# Patient Record
Sex: FEMALE | Race: OTHER | Employment: FULL TIME | URBAN - METROPOLITAN AREA
[De-identification: names, ages, dates, MRNs, and addresses within clinical notes are randomized per-mention and may not be internally consistent; named-entity substitution may affect disease eponyms.]

---

## 2017-01-19 ENCOUNTER — HOSPITAL ENCOUNTER (EMERGENCY)
Age: 42
Discharge: HOME | End: 2017-01-19
Attending: EMERGENCY MEDICINE
Payer: SELF-PAY

## 2017-01-19 ENCOUNTER — APPOINTMENT (OUTPATIENT)
Dept: CT IMAGING | Age: 42
End: 2017-01-19
Attending: EMERGENCY MEDICINE
Payer: SELF-PAY

## 2017-01-19 VITALS
SYSTOLIC BLOOD PRESSURE: 183 MMHG | RESPIRATION RATE: 20 BRPM | HEIGHT: 66 IN | WEIGHT: 153 LBS | HEART RATE: 97 BPM | BODY MASS INDEX: 24.59 KG/M2 | OXYGEN SATURATION: 100 % | DIASTOLIC BLOOD PRESSURE: 93 MMHG | TEMPERATURE: 99 F

## 2017-01-19 DIAGNOSIS — T17.208A FOREIGN BODY IN THROAT, INITIAL ENCOUNTER: Primary | ICD-10-CM

## 2017-01-19 DIAGNOSIS — R07.9 ACUTE CHEST PAIN: ICD-10-CM

## 2017-01-19 LAB
ALBUMIN SERPL BCP-MCNC: 3.9 G/DL (ref 3.5–5)
ALBUMIN/GLOB SERPL: 1 {RATIO} (ref 1.1–2.2)
ALP SERPL-CCNC: 73 U/L (ref 45–117)
ALT SERPL-CCNC: 27 U/L (ref 12–78)
ANION GAP BLD CALC-SCNC: 13 MMOL/L (ref 5–15)
AST SERPL W P-5'-P-CCNC: 18 U/L (ref 15–37)
ATRIAL RATE: 111 BPM
BASOPHILS # BLD AUTO: 0 K/UL (ref 0–0.1)
BASOPHILS # BLD: 0 % (ref 0–1)
BILIRUB SERPL-MCNC: 0.2 MG/DL (ref 0.2–1)
BUN SERPL-MCNC: 7 MG/DL (ref 6–20)
BUN/CREAT SERPL: 9 (ref 12–20)
CALCIUM SERPL-MCNC: 8.4 MG/DL (ref 8.5–10.1)
CALCULATED P AXIS, ECG09: 34 DEGREES
CALCULATED R AXIS, ECG10: 1 DEGREES
CALCULATED T AXIS, ECG11: 4 DEGREES
CHLORIDE SERPL-SCNC: 101 MMOL/L (ref 97–108)
CO2 SERPL-SCNC: 22 MMOL/L (ref 21–32)
CREAT SERPL-MCNC: 0.79 MG/DL (ref 0.55–1.02)
DIAGNOSIS, 93000: NORMAL
EOSINOPHIL # BLD: 0.6 K/UL (ref 0–0.4)
EOSINOPHIL NFR BLD: 6 % (ref 0–7)
ERYTHROCYTE [DISTWIDTH] IN BLOOD BY AUTOMATED COUNT: 16.3 % (ref 11.5–14.5)
GLOBULIN SER CALC-MCNC: 3.9 G/DL (ref 2–4)
GLUCOSE SERPL-MCNC: 164 MG/DL (ref 65–100)
HCT VFR BLD AUTO: 33 % (ref 35–47)
HGB BLD-MCNC: 10.4 G/DL (ref 11.5–16)
LYMPHOCYTES # BLD AUTO: 23 % (ref 12–49)
LYMPHOCYTES # BLD: 2.3 K/UL (ref 0.8–3.5)
MCH RBC QN AUTO: 25.1 PG (ref 26–34)
MCHC RBC AUTO-ENTMCNC: 31.5 G/DL (ref 30–36.5)
MCV RBC AUTO: 79.5 FL (ref 80–99)
MONOCYTES # BLD: 0.8 K/UL (ref 0–1)
MONOCYTES NFR BLD AUTO: 8 % (ref 5–13)
NEUTS SEG # BLD: 6.3 K/UL (ref 1.8–8)
NEUTS SEG NFR BLD AUTO: 63 % (ref 32–75)
P-R INTERVAL, ECG05: 142 MS
PLATELET # BLD AUTO: 399 K/UL (ref 150–400)
POTASSIUM SERPL-SCNC: 3.1 MMOL/L (ref 3.5–5.1)
PROT SERPL-MCNC: 7.8 G/DL (ref 6.4–8.2)
Q-T INTERVAL, ECG07: 320 MS
QRS DURATION, ECG06: 84 MS
QTC CALCULATION (BEZET), ECG08: 435 MS
RBC # BLD AUTO: 4.15 M/UL (ref 3.8–5.2)
SODIUM SERPL-SCNC: 136 MMOL/L (ref 136–145)
TROPONIN I SERPL-MCNC: <0.04 NG/ML
VENTRICULAR RATE, ECG03: 111 BPM
WBC # BLD AUTO: 10 K/UL (ref 3.6–11)

## 2017-01-19 PROCEDURE — 74011000250 HC RX REV CODE- 250: Performed by: EMERGENCY MEDICINE

## 2017-01-19 PROCEDURE — 70490 CT SOFT TISSUE NECK W/O DYE: CPT

## 2017-01-19 PROCEDURE — 74011250637 HC RX REV CODE- 250/637: Performed by: EMERGENCY MEDICINE

## 2017-01-19 PROCEDURE — 99284 EMERGENCY DEPT VISIT MOD MDM: CPT

## 2017-01-19 PROCEDURE — 93005 ELECTROCARDIOGRAM TRACING: CPT

## 2017-01-19 PROCEDURE — 36415 COLL VENOUS BLD VENIPUNCTURE: CPT | Performed by: EMERGENCY MEDICINE

## 2017-01-19 PROCEDURE — 85025 COMPLETE CBC W/AUTO DIFF WBC: CPT | Performed by: EMERGENCY MEDICINE

## 2017-01-19 PROCEDURE — 84484 ASSAY OF TROPONIN QUANT: CPT | Performed by: EMERGENCY MEDICINE

## 2017-01-19 PROCEDURE — 80053 COMPREHEN METABOLIC PANEL: CPT | Performed by: EMERGENCY MEDICINE

## 2017-01-19 RX ORDER — SODIUM CHLORIDE 0.9 % (FLUSH) 0.9 %
5-10 SYRINGE (ML) INJECTION EVERY 8 HOURS
Status: DISCONTINUED | OUTPATIENT
Start: 2017-01-19 | End: 2017-01-19 | Stop reason: HOSPADM

## 2017-01-19 RX ORDER — SODIUM CHLORIDE 0.9 % (FLUSH) 0.9 %
5-10 SYRINGE (ML) INJECTION AS NEEDED
Status: DISCONTINUED | OUTPATIENT
Start: 2017-01-19 | End: 2017-01-19 | Stop reason: HOSPADM

## 2017-01-19 RX ADMIN — LIDOCAINE HYDROCHLORIDE 40 ML: 20 SOLUTION ORAL; TOPICAL at 14:13

## 2017-01-19 NOTE — ED TRIAGE NOTES
Foreign body/bone swallowed 1 week ago; patient reports feeling something stuck in throat x 1 week, chest pain and tingling in both arms and neck.

## 2017-01-19 NOTE — ED NOTES
Patient arrived through triage accompanied by family c/o feeling like she got a chicken bone stuck in her throat a few days ago that is now causing her pain with swallowing and states she is scared to eat and drink. Patient is speaking sentances and tolerating po without difficulty. Patient states that she was worried and became sob and had some chest pain and pain into bilateral arms. Patient is resting in bed, bed in low position, call bell in reach,monitor x3, family at bedside.

## 2017-01-19 NOTE — ED NOTES
Patient discharged by provider. Patient has no new complaints at this time. Patient leaves ambulatory with steady gait.

## 2017-01-19 NOTE — DISCHARGE INSTRUCTIONS
Dolor de pecho: Instrucciones de cuidado - [ Chest Pain: Care Instructions ]  Instrucciones de cuidado  El dolor de pecho puede tener muchas causas. Algunas no son graves y mejorarán por sí solas en pocos días. Fer algunos tipos de dolor de pecho requieren más pruebas y [place]. Es posible que marin médico le haya recomendado jessica visita de seguimiento dentro de las 8 a 12 horas siguientes. Si no mejora, es posible que necesite [address] pruebas o [place]. Aunque marin médico le haya dado de gregory, es necesario que esté atento a cualquier problema que se presente. El médico le hizo un cuidadoso chequeo, fer a veces los problemas pueden aparecer posteriormente. Si tiene nuevos síntomas o éstos no mejoran, obtenga atención médica de inmediato. Si tiene dolor o presión en el pecho que empeora o es diferente y que dura más de 5 minutos, o se desmayó (perdió el conocimiento), llame al 911 o busque otra ayuda de emergencia de inmediato. Acudir a jessica consulta médica es sólo un paso en marin tratamiento. Aunque se sienta mejor, todavía deberá hacer lo que marin médico le recomiende, jeremie asistir a todas las visitas de seguimiento sugeridas y amparo los medicamentos exactamente [employer]	[place] fueron indicados. [place] le ayudará a recuperarse y prevenir problemas futuros. ¿Cómo puede cuidarse en el hogar? · Descanse hasta que se sienta mejor. · [place] alanna medicamentos exactamente jeremie le fueron recetados. Llame a marin médico si cande estar teniendo problemas con marin medicamento. · No conduzca después de amparo un analgésico (medicamento para el dolor) recetado. ¿Cuándo debe pedir ayuda? Llame al 911 si:  · Se desmayó (perdió el conocimiento). · Tiene graves dificultades para respirar. · Tiene síntomas de un ataque al corazón. Estos podrían incluir:  ¨ Dolor o presión en el pecho, o jessica sensación extraña en el pecho. ¨ Sudoración. ¨ Falta de aire. ¨ Náuseas o vómito.   ¨ Dolor, presión o jessica sensación extraña en la espalda, el saleem, la mandíbula, la parte superior del abdomen o en ozzie o ambos hombros o brazos. ¨ Aturdimiento o debilidad repentina. ¨ Latidos del corazón rápidos o irregulares. Después de llamar al 911, es posible que el operador le diga que mastique 1 aspirina para adultos o de 2 a 4 aspirinas de dosis baja. Espere a jessica ambulancia. No intente conducir usted mismo. Llame hoy a marin médico si:  · Tiene cualquier dificultad para respirar. · El dolor en el pecho empeora. · Siente mareos o aturdimiento, o que está a punto de [place]. · No mejora jeremie se esperaba. · Tiene dolor de pecho nuevo o diferente. ¿Dónde puede encontrar más información en inglés? Brittanie Durán a http://dajuan-rusty.info/. Escriba A120 en la búsqueda para aprender más acerca de \"Dolor de pecho: Instrucciones de cuidado - [ Chest Pain: Care Instructions ]. \"  Revisado: 27 capps, 2016  Versión del contenido: 11.1  © 9506-4939 Healthwise, Incorporated. Las instrucciones de cuidado fueron adaptadas bajo licencia por [employer] (which disclaims liability or warranty for this information). Si usted tiene [place] afección médica o sobre estas instrucciones, siempre pregunte a marin profesional de nell. Healthwise, Incorporated niega toda garantía o responsabilidad por marin uso de esta información. We hope that we have addressed all of your medical concerns. The examination and treatment you received in the Emergency Department were for an emergent problem and were not intended as complete care. It is important that you follow up with your healthcare provider(s) for ongoing care. If your symptoms worsen or do not improve as expected, and you are unable to reach your usual health care provider(s), you should return to the Emergency Department. Today's healthcare is undergoing tremendous change, and patient satisfaction surveys are one of the many tools to assess the quality of medical care.   You may receive a survey from the [employer] regarding your experience in the Emergency Department. I hope that your experience has been completely positive, particularly the medical care that I provided. As such, please participate in the survey; anything less than excellent does not meet my expectations or intentions. [address] [facility] and [address] participate in nationally recognized quality of care measures. If your blood pressure is greater than 120/80, as reported below, we urge that you seek medical care to address the potential of high blood pressure, commonly known as hypertension. Hypertension can be hereditary or can be caused by certain medical conditions, pain, stress, or \"white coat syndrome. \"       Please make an appointment with your health care provider(s) for follow up of your Emergency Department visit. VITALS:   Patient Vitals for the past 8 hrs:   Temp Pulse Resp BP SpO2   01/19/17 1405 - 97 - - 100 %   01/19/17 1403 - - - (!) 197/99 -   01/19/17 1333 99 °F (37.2 °C) (!) 114 20 (!) 202/100 100 %          Thank you for allowing us to provide you with medical care today. We realize that you have many choices for your emergency care needs. Please choose us in the future for any continued health care needs.       Rachel Robert MD    [facility], [facility].   Office: [phone]            Recent Results (from the past 24 hour(s))   CBC WITH AUTOMATED DIFF    Collection Time: 01/19/17  2:10 PM   Result Value Ref Range    WBC 10.0 3.6 - 11.0 K/uL    RBC 4.15 3.80 - 5.20 M/uL    HGB 10.4 (L) 11.5 - 16.0 g/dL    HCT 33.0 (L) 35.0 - 47.0 %    MCV 79.5 (L) 80.0 - 99.0 FL    MCH 25.1 (L) 26.0 - 34.0 PG    MCHC 31.5 30.0 - 36.5 g/dL    RDW 16.3 (H) 11.5 - 14.5 %    PLATELET 896 791 - 115 K/uL    NEUTROPHILS 63 32 - 75 %    LYMPHOCYTES 23 12 - 49 %    MONOCYTES 8 5 - 13 %    EOSINOPHILS 6 0 - 7 %    BASOPHILS 0 0 - 1 % ABS. NEUTROPHILS 6.3 1.8 - 8.0 K/UL    ABS. LYMPHOCYTES 2.3 0.8 - 3.5 K/UL    ABS. MONOCYTES 0.8 0.0 - 1.0 K/UL    ABS. EOSINOPHILS 0.6 (H) 0.0 - 0.4 K/UL    ABS. BASOPHILS 0.0 0.0 - 0.1 K/UL   METABOLIC PANEL, COMPREHENSIVE    Collection Time: 01/19/17  2:10 PM   Result Value Ref Range    Sodium 136 136 - 145 mmol/L    Potassium 3.1 (L) 3.5 - 5.1 mmol/L    Chloride 101 97 - 108 mmol/L    CO2 22 21 - 32 mmol/L    Anion gap 13 5 - 15 mmol/L    Glucose 164 (H) 65 - 100 mg/dL    BUN 7 6 - 20 MG/DL    Creatinine 0.79 0.55 - 1.02 MG/DL    BUN/Creatinine ratio 9 (L) 12 - 20      GFR est AA >60 >60 ml/min/1.73m2    GFR est non-AA >60 >60 ml/min/1.73m2    Calcium 8.4 (L) 8.5 - 10.1 MG/DL    Bilirubin, total 0.2 0.2 - 1.0 MG/DL    ALT 27 12 - 78 U/L    AST 18 15 - 37 U/L    Alk. phosphatase 73 45 - 117 U/L    Protein, total 7.8 6.4 - 8.2 g/dL    Albumin 3.9 3.5 - 5.0 g/dL    Globulin 3.9 2.0 - 4.0 g/dL    A-G Ratio 1.0 (L) 1.1 - 2.2     TROPONIN I    Collection Time: 01/19/17  2:10 PM   Result Value Ref Range    Troponin-I, Qt. <0.04 <0.05 ng/mL       Ct Neck Soft Tissue Wo Cont    Result Date: 1/19/2017  EXAM:  CT NECK SOFT TISSUE WO CONT INDICATION:  persistent foreign body sensation after swallowing fishbone 1.5 weeks ago COMPARISON: None. CONTRAST: None. . TECHNIQUE: Multislice helical CT was performed from the mid calvarium to the aortic arch without intravenous contrast administration. Contiguous 2.5 mm axial images were reconstructed and lung and soft tissue windows were generated. Coronal and sagittal reformations were generated. CT dose reduction was achieved through use of a standardized protocol tailored for this examination and automatic exposure control for dose modulation. FINDINGS: No foreign body is identified along the visualized aerodigestive tract. There is a punctate focus of gas laterally adjacent to the anterior right true cord (series 3, images 71 and 72 and series 601B, image 40).  There is some medial displacement of the right true cord relative to left. No mass or adenopathy is identified within the neck. The carotid and jugular vessels enhance normally. The thyroid gland, submandibular salivary glands and parotid glands are normal. No nasopharyngeal, pharyngeal or laryngeal mass is identified. No abnormalities are identified in the visualized portions of the brain or orbits. The visualized mastoid air cells and paranasal sinuses are clear. The visualized portions of the lung apices are clear. IMPRESSION: Tiny focus of gas lateral to right vocal cord with slight medial deviation of right vocal cord could reflect site of penetrating injury by radiographically occult foreign body. Consider direct visualization with endoscopy.

## 2017-01-19 NOTE — LETTER
[address] 
[facility] EMERGENCY DEPT 
[address] [facility] [id number] [phone] Work/School Note Date: 1/19/2017 To Whom It May concern: 
 
Franklin Walden was seen and treated today in the emergency room by the following provider(s): 
Attending Provider: Saurabh Espitia MD.   
 
Franklin Walden may return to work on 1/19/17. Sincerely, Saurabh Espitia MD

## 2017-01-19 NOTE — ED PROVIDER NOTES
HPI Comments: 35 y.o. female with no significant past medical history who presents from home with chief complaint of throat discomfort. Pt complains of throat discomfort with associated SOB and anxiety when pt tries to eat solid food. Pt suspects that she swallowed a fish bone 1.5 weeks ago. Pt says she feels like something is stuck in the right side of her throat, and every time she tries to eat solid foods she feels like she is going to choke. Pt additionally reports chest pain, neck pain, and numbness/tingling in her hands bilaterally. There are no other acute medical concerns at this time. Note written by David. Iza Kilgore, as dictated by Anita Leary MD 2:00 PM      The history is provided by the patient and a relative. The history is limited by a language barrier. A  was used (relative). History reviewed. No pertinent past medical history. History reviewed. No pertinent past surgical history. History reviewed. No pertinent family history. Social History     Social History    Marital status: SINGLE     Spouse name: N/A    Number of children: N/A    Years of education: N/A     Occupational History    Not on file. Social History Main Topics    Smoking status: Never Smoker    Smokeless tobacco: Not on file    Alcohol use No    Drug use: Not on file    Sexual activity: Not on file     Other Topics Concern    Not on file     Social History Narrative         ALLERGIES: Review of patient's allergies indicates no known allergies. Review of Systems   Constitutional: Negative for appetite change. HENT: Positive for trouble swallowing. Throat discomfort    Respiratory: Positive for choking and shortness of breath. Cardiovascular: Positive for chest pain. Musculoskeletal: Positive for neck pain. Neurological: Positive for numbness. Psychiatric/Behavioral: The patient is nervous/anxious.     All other systems reviewed and are negative. Vitals:    01/19/17 1333 01/19/17 1403 01/19/17 1405   BP: (!) 202/100 (!) 197/99    Pulse: (!) 114  97   Resp: 20     Temp: 99 °F (37.2 °C)     SpO2: 100%  100%   Weight: 69.4 kg (153 lb)     Height: 5' 6\" (1.676 m)              Physical Exam   Constitutional: She is oriented to person, place, and time. She appears well-developed and well-nourished. No distress. Appears anxious   HENT:   Head: Normocephalic and atraumatic. Eyes: Conjunctivae are normal. No scleral icterus. Neck: Neck supple. No JVD present. No tracheal deviation present. Cardiovascular: Normal rate, regular rhythm, normal heart sounds and intact distal pulses. Exam reveals no gallop and no friction rub. No murmur heard. Pulmonary/Chest: Effort normal and breath sounds normal. She has no wheezes. She has no rales. Abdominal: Soft. She exhibits no distension. There is no tenderness. There is no rebound and no guarding. Musculoskeletal: She exhibits no edema. Neurological: She is alert and oriented to person, place, and time. oriented   Skin: Skin is warm and dry. No rash noted. No pallor. Psychiatric: She has a normal mood and affect. Nursing note and vitals reviewed. [facility]  ED Course       Procedures  CONSULT NOTE:  2:21 PM Yuliya Sawyer MD spoke with Dr. Yumiko Nash, Consult for ENT. Discussed available diagnostic tests and clinical findings. He recommends soft tissue neck CT. He can see the pt in the office after completion of evaluation in the ED. ED EKG interpretation:  Rhythm: sinus tachycardia; Rate (approx.): 111; ST/T wave: non-specific changes. [facility]. Jo Hebert, as dictated by Yuliya Sawyer MD 2:01 PM    Progress Note:  Results, treatment, and follow up plan have been discussed with patient/family. Questions were answered.    Yuliya Sawyer MD     A/P: throat foreign body for 1.5 weeks - CT shows suspected foreign body; no trouble with breathing/secretions noted; ENT to see upon discharge; CP/dyspnea - suspect related to anxiety; EKG, CBC, CMP, trop ok.   Juan Roe MD

## 2017-02-14 ENCOUNTER — APPOINTMENT (OUTPATIENT)
Dept: GENERAL RADIOLOGY | Age: 42
End: 2017-02-14
Attending: EMERGENCY MEDICINE
Payer: SELF-PAY

## 2017-02-14 ENCOUNTER — APPOINTMENT (OUTPATIENT)
Dept: ULTRASOUND IMAGING | Age: 42
End: 2017-02-14
Attending: EMERGENCY MEDICINE
Payer: SELF-PAY

## 2017-02-14 ENCOUNTER — HOSPITAL ENCOUNTER (EMERGENCY)
Age: 42
Discharge: HOME | End: 2017-02-14
Attending: EMERGENCY MEDICINE
Payer: SELF-PAY

## 2017-02-14 VITALS
HEIGHT: 61 IN | BODY MASS INDEX: 26.51 KG/M2 | OXYGEN SATURATION: 99 % | WEIGHT: 140.38 LBS | DIASTOLIC BLOOD PRESSURE: 94 MMHG | TEMPERATURE: 98.3 F | HEART RATE: 88 BPM | RESPIRATION RATE: 18 BRPM | SYSTOLIC BLOOD PRESSURE: 173 MMHG

## 2017-02-14 DIAGNOSIS — R10.13 ABDOMINAL PAIN, EPIGASTRIC: Primary | ICD-10-CM

## 2017-02-14 DIAGNOSIS — K21.00 GASTROESOPHAGEAL REFLUX DISEASE WITH ESOPHAGITIS: ICD-10-CM

## 2017-02-14 LAB
ALBUMIN SERPL BCP-MCNC: 3.7 G/DL (ref 3.5–5)
ALBUMIN/GLOB SERPL: 1.1 {RATIO} (ref 1.1–2.2)
ALP SERPL-CCNC: 62 U/L (ref 45–117)
ALT SERPL-CCNC: 27 U/L (ref 12–78)
ANION GAP BLD CALC-SCNC: 10 MMOL/L (ref 5–15)
AST SERPL W P-5'-P-CCNC: 13 U/L (ref 15–37)
BASOPHILS # BLD AUTO: 0 K/UL (ref 0–0.1)
BASOPHILS # BLD: 0 % (ref 0–1)
BILIRUB SERPL-MCNC: 0.2 MG/DL (ref 0.2–1)
BUN SERPL-MCNC: 11 MG/DL (ref 6–20)
BUN/CREAT SERPL: 21 (ref 12–20)
CALCIUM SERPL-MCNC: 8.7 MG/DL (ref 8.5–10.1)
CHLORIDE SERPL-SCNC: 104 MMOL/L (ref 97–108)
CO2 SERPL-SCNC: 26 MMOL/L (ref 21–32)
CREAT SERPL-MCNC: 0.52 MG/DL (ref 0.55–1.02)
DIFFERENTIAL METHOD BLD: ABNORMAL
EOSINOPHIL # BLD: 0.3 K/UL (ref 0–0.4)
EOSINOPHIL NFR BLD: 3 % (ref 0–7)
ERYTHROCYTE [DISTWIDTH] IN BLOOD BY AUTOMATED COUNT: 17.3 % (ref 11.5–14.5)
GLOBULIN SER CALC-MCNC: 3.3 G/DL (ref 2–4)
GLUCOSE SERPL-MCNC: 117 MG/DL (ref 65–100)
HCT VFR BLD AUTO: 32.8 % (ref 35–47)
HGB BLD-MCNC: 10.4 G/DL (ref 11.5–16)
LIPASE SERPL-CCNC: 218 U/L (ref 73–393)
LYMPHOCYTES # BLD AUTO: 27 % (ref 12–49)
LYMPHOCYTES # BLD: 2.3 K/UL (ref 0.8–3.5)
MCH RBC QN AUTO: 25.2 PG (ref 26–34)
MCHC RBC AUTO-ENTMCNC: 31.7 G/DL (ref 30–36.5)
MCV RBC AUTO: 79.6 FL (ref 80–99)
MONOCYTES # BLD: 0.8 K/UL (ref 0–1)
MONOCYTES NFR BLD AUTO: 9 % (ref 5–13)
NEUTS SEG # BLD: 5.2 K/UL (ref 1.8–8)
NEUTS SEG NFR BLD AUTO: 61 % (ref 32–75)
PLATELET # BLD AUTO: 370 K/UL (ref 150–400)
POTASSIUM SERPL-SCNC: 3.3 MMOL/L (ref 3.5–5.1)
PROT SERPL-MCNC: 7 G/DL (ref 6.4–8.2)
RBC # BLD AUTO: 4.12 M/UL (ref 3.8–5.2)
RBC MORPH BLD: ABNORMAL
SODIUM SERPL-SCNC: 140 MMOL/L (ref 136–145)
WBC # BLD AUTO: 8.6 K/UL (ref 3.6–11)

## 2017-02-14 PROCEDURE — 93005 ELECTROCARDIOGRAM TRACING: CPT

## 2017-02-14 PROCEDURE — 96361 HYDRATE IV INFUSION ADD-ON: CPT

## 2017-02-14 PROCEDURE — 71020 XR CHEST PA LAT: CPT

## 2017-02-14 PROCEDURE — 96374 THER/PROPH/DIAG INJ IV PUSH: CPT

## 2017-02-14 PROCEDURE — 74011250637 HC RX REV CODE- 250/637: Performed by: EMERGENCY MEDICINE

## 2017-02-14 PROCEDURE — 74011000250 HC RX REV CODE- 250: Performed by: EMERGENCY MEDICINE

## 2017-02-14 PROCEDURE — 83690 ASSAY OF LIPASE: CPT | Performed by: EMERGENCY MEDICINE

## 2017-02-14 PROCEDURE — 74011250636 HC RX REV CODE- 250/636: Performed by: EMERGENCY MEDICINE

## 2017-02-14 PROCEDURE — 85025 COMPLETE CBC W/AUTO DIFF WBC: CPT | Performed by: EMERGENCY MEDICINE

## 2017-02-14 PROCEDURE — 80053 COMPREHEN METABOLIC PANEL: CPT | Performed by: EMERGENCY MEDICINE

## 2017-02-14 PROCEDURE — 36415 COLL VENOUS BLD VENIPUNCTURE: CPT | Performed by: EMERGENCY MEDICINE

## 2017-02-14 PROCEDURE — 99282 EMERGENCY DEPT VISIT SF MDM: CPT

## 2017-02-14 PROCEDURE — 76705 ECHO EXAM OF ABDOMEN: CPT

## 2017-02-14 RX ORDER — HYDROCHLOROTHIAZIDE 25 MG/1
25 TABLET ORAL DAILY
Qty: 15 TAB | Refills: 0 | Status: SHIPPED | OUTPATIENT
Start: 2017-02-14 | End: 2017-03-01

## 2017-02-14 RX ORDER — FAMOTIDINE 10 MG/ML
20 INJECTION INTRAVENOUS
Status: COMPLETED | OUTPATIENT
Start: 2017-02-14 | End: 2017-02-14

## 2017-02-14 RX ORDER — FAMOTIDINE 20 MG/1
20 TABLET, FILM COATED ORAL 2 TIMES DAILY
Qty: 28 TAB | Refills: 0 | Status: SHIPPED | OUTPATIENT
Start: 2017-02-14 | End: 2017-02-28

## 2017-02-14 RX ORDER — SUCRALFATE 1 G/1
1 TABLET ORAL 4 TIMES DAILY
Qty: 56 TAB | Refills: 0 | Status: SHIPPED | OUTPATIENT
Start: 2017-02-14 | End: 2017-02-28

## 2017-02-14 RX ADMIN — FAMOTIDINE 20 MG: 10 INJECTION, SOLUTION INTRAVENOUS at 22:27

## 2017-02-14 RX ADMIN — SODIUM CHLORIDE 1000 ML: 900 INJECTION, SOLUTION INTRAVENOUS at 22:27

## 2017-02-14 RX ADMIN — LIDOCAINE HYDROCHLORIDE 40 ML: 20 SOLUTION ORAL; TOPICAL at 22:27

## 2017-02-15 LAB
ATRIAL RATE: 98 BPM
CALCULATED P AXIS, ECG09: 35 DEGREES
CALCULATED R AXIS, ECG10: 38 DEGREES
CALCULATED T AXIS, ECG11: -6 DEGREES
DIAGNOSIS, 93000: NORMAL
P-R INTERVAL, ECG05: 98 MS
Q-T INTERVAL, ECG07: 338 MS
QRS DURATION, ECG06: 76 MS
QTC CALCULATION (BEZET), ECG08: 431 MS
VENTRICULAR RATE, ECG03: 98 BPM

## 2017-02-15 NOTE — ED TRIAGE NOTES
Triage note: patient is coming in with 3 week history of shortness of breath for 3 weeks. Patient is now having pain to the right side from the face all the way down to the abdomen. Patient has been seen at [facility] 2 times for the same and \"nothing was done. \".

## 2017-02-15 NOTE — DISCHARGE INSTRUCTIONS
Dolor abdominal: Instrucciones de cuidado - [ Abdominal Pain: Care Instructions ]  Instrucciones de cuidado    El dolor abdominal tiene muchas causas posibles. Algunas de ellas no son graves y mejoran por sí solas en unos días. Otras requieren Letty Elliott y [place]. Si marin dolor continúa o [place], necesitará jessica nueva revisión y [place] pruebas para determinar qué pasa. Es posible que necesite cirugía para corregir el problema. No ignore nuevos síntomas, jeremie fiebre, náuseas y [place], [facility], dolor que [place] o [place]. Podrían ser señales de un problema más grave. Marin médico puede haberle recomendado jessica consulta de Dagoberto & Andressa las 8 o 12 horas siguientes. Si no se siente mejor, es posible que requiera Letty Elliott o [place]. El médico lo berman revisado minuciosamente, edy puede eulogio problemas más tarde. Si nota algún problema o síntomas nuevos, busque tratamiento médico inmediatamente. La atención de seguimiento es jessica parte clave de marin tratamiento y seguridad. Asegúrese de hacer y acudir a todas las citas, y llame a marin médico si está teniendo problemas. También es jessica buena idea saber los resultados de los exámenes y mantener jessica lista de los medicamentos que jaelyn. ¿Cómo puede cuidarse en el hogar? · Descanse hasta que se sienta mejor. · Para prevenir la deshidratación, ashkan abundantes líquidos, suficientes para que marin orina sea de color amarillo miguel a o transparente jeremie el agua. Elija beber agua y otros líquidos [place] sin cafeína hasta que se sienta mejor. Si tiene Trell[employer] Lingu[employer]o, del corazón o del hígado y tiene que Sammy's líquidos, hable con marin médico antes de aumentar marin consumo. · Si tiene [employer], coma alimentos suaves, jeremie arroz, pan don seco o galletas saladas, bananas (plátanos) y puré de Synchari. Trate de comer varias comidas pequeñas al día en lugar de dos o donovan grandes.   · Espere hasta 50 horas después de que [employer] síntomas hayan desaparecido antes de comer alimentos condimentados, alcohol y bebidas que contengan cafeína. · No consuma alimentos ricos en grasa. · Evite medicamentos antiinflamatorios jeremie aspirina, ibuprofeno (Advil, Motrin) y naproxeno (Aleve). Pueden causar [employer]. Dígale a marin médico si está tomando aspirina diariamente debido a otro problema de nell. ¿Cuándo debe pedir ayuda? Llame al 911 en cualquier momento que considere que necesita atención de emergencia. Por ejemplo, llame si:  · Se desmayó (perdió el conocimiento). · Las heces son de color rojizo o muy sanguinolentas (con carlos alberto). · Vomita carlos alberto o algo parecido a granos de café molido. · Tiene dolor abdominal nuevo e intenso. Llame a marin médico ahora mismo o busque atención médica inmediata si:  · Marin dolor empeora, sobre todo si se concentra en jessica leslie parte del vientre. · Vuelve a tener fiebre o tiene fiebre más gregory. · Zoila heces son negruzcas y parecidas al alquitrán o tienen rastros de [place]. · Tiene sangrado vaginal inesperado. · Tiene síntomas de jessica infección del tracto urinario. Estos podrían incluir:  ¨ Dolor al [place]. ¨ Orinar con más frecuencia que lo habitual.  ¨ Carlos Alberto en la [place]. · Siente mareos o aturdimiento, o que está a punto de [place]. Preste especial atención a los cambios en marin nell y asegúrese de comunicarse con marin médico si:  · No está mejorando después de 1 día (24 horas). ¿Dónde puede encontrar más información en inglés? Fadi Labella a http://djauan-rusty.info/. [facility] D433 en la búsqueda para aprender más acerca de \"Dolor abdominal: Instrucciones de cuidado - [ Abdominal Pain: Care Instructions ]. \"  Revisado: 27 capps, 2016  Versión del contenido: 11.1  © 4945-9272 amprice, [employer]. Las instrucciones de cuidado fueron adaptadas bajo licencia por [employer] (which disclaims liability or warranty for this information).  Si usted tiene [place] [employer] afección médica o sobre estas instrucciones, siempre pregunte a leon profesional de nell. [facility] niega toda garantía o responsabilidad por leon uso de esta información. Enfermedad de reflujo gastroesofágico (GERD): Instrucciones de cuidado - [ Gastroesophageal Reflux Disease (GERD): Care Instructions ]  Instrucciones de cuidado    La enfermedad de reflujo gastroesofágico (GERD, por alanna siglas en inglés) es cuando el ácido estomacal se devuelve hacia el esófago. El esófago es el conducto que va de la garganta al [facility]. Jessica válvula de jessica leslie vía harry que el ácido del estómago se devuelva por devang conducto. Cuando usted tiene GERD, esta válvula no se nancy lo suficientemente firme. Si usted tiene síntomas leves de GERD, jeremie acidez estomacal, es posible que pueda controlar el problema con antiácidos o medicamentos de [place]. Cambiar la alimentación, bajar de peso y hacer otros cambios en el estilo de bert también pueden ayudar a reducir los síntomas. La atención de seguimiento es jessica parte clave de leon tratamiento y seguridad. Asegúrese de hacer y acudir a todas las citas, y llame a leon médico si está teniendo problemas. También es jessica buena idea saber los resultados de los exámenes y mantener jessica lista de los medicamentos que jaelyn. ¿Cómo puede cuidarse en el hogar? · [employer] medicamentos exactamente jeremie le fueron recetados. Llame a leon médico si cande estar teniendo problemas con leon medicamento. · Leon médico le podría recomendar medicamentos de [place]. Para la indigestión leve u ocasional pueden servirle los antiácidos jeremie Tums, Gaviscon, Mylanta o Maalox. Leon médico también podría recomendar reductores de ácido de venta anand, jeremie Pepcid AC, Tagamet HB, Zantac 75 o Prilosec. [place] y siga todas las instrucciones de la [place]. Si [place] estos medicamentos con frecuencia, hable con leon médico.  · Cambie alanna hábitos alimenticios.   ¨ Es mejor comer varias comidas pequeñas en lugar de dos o donovan comidas abundantes. ¨ Después de comer, espere de 2 a 3 horas antes de recostarse. ¨ El chocolate, la menta y el alcohol pueden empeorar la GERD. ¨ En [employer], los alimentos condimentados, los alimentos que tienen mucho ácido (jeremie los tomates y las naranjas) y el café pueden empeorar los síntomas de la GERD. Si zoila síntomas empeoran después de comer un determinado alimento, se recomienda que deje de comer lilliam alimento para shelby si zoila síntomas mejoran. · No fume ni masque tabaco. Fumar puede agravar la GERD. Si necesita ayuda para dejar de usar tabaco, hable con marin médico [employer] y medicamentos para dejar de usarlo. Éstos pueden aumentar zoila probabilidades de dejar el hábito para siempre. · Si tiene síntomas de GERD yousuf la noche, eleve la cabecera de marin cama entre 6 y 6 pulgadas (entre 15 y 20 cm) colocando ladrillos debajo del sandip de la cama o jessica cuña de espuma debajo de la cabecera del colchón. (Usar almohadas adicionales no funciona). · No use ropa que le ajuste mucho la parte media del cuerpo. · Baje de peso, si necesita hacerlo. Perder sólo de 5 a 10 libras (2.3 a 4.5 kg) puede ayudarle. ¿Cuándo debe pedir ayuda? Llame a marin médico ahora mismo o busque atención médica inmediata si:  · Tiene un dolor de estómago nuevo o distinto. · Zoila heces son negruzcas y parecidas al alquitrán o tienen rastros de [place]. Preste especial atención a los cambios en marin nell y asegúrese de comunicarse con marin médico si:  · Zoila síntomas no  casey después de 2 días. · La comida parece quedarle atorada en la garganta o el pecho. ¿Dónde puede encontrar más información en inglés? Sendy Rossi a http://dajuan-rusty.info/. Audrey Celaya G656 en la búsqueda para aprender más acerca de \"Enfermedad de reflujo gastroesofágico (GERD): Instrucciones de cuidado - [ Gastroesophageal Reflux Disease (GERD): Care Instructions ]. \"  Revisado: 9 agosto, 2016  Versión del contenido: 11.1  © 1672-9028 Healthwise, Incorporated. Las instrucciones de cuidado fueron adaptadas bajo licencia por [employer] (which disclaims liability or warranty for this information). Si usted tiene Yuma Hughes afección médica o sobre estas instrucciones, siempre pregunte a marin profesional de nell. Healthwise, Incorporated niega toda garantía o responsabilidad por marin uso de esta información. We hope that we have addressed all of your medical concerns. The examination and treatment you received in the Emergency Department were for an emergent problem and were not intended as complete care. It is important that you follow up with your healthcare provider(s) for ongoing care. If your symptoms worsen or do not improve as expected, and you are unable to reach your usual health care provider(s), you should return to the Emergency Department. Today's healthcare is undergoing tremendous change, and patient satisfaction surveys are one of the many tools to assess the quality of medical care. You may receive a survey from the QA on Request regarding your experience in the Emergency Department. I hope that your experience has been completely positive, particularly the medical care that I provided. As such, please participate in the survey; anything less than excellent does not meet my expectations or intentions. [facility] and [facility] participate in nationally recognized quality of care measures. If your blood pressure is greater than 120/80, as reported below, we urge that you seek medical care to address the potential of high blood pressure, commonly known as hypertension. Hypertension can be hereditary or can be caused by certain medical conditions, pain, stress, or \"white coat syndrome. \"       Please make an appointment with your health care provider(s) for follow up of your Emergency Department visit.        VITALS:   Patient Vitals for the past 8 hrs:   Temp Pulse Resp BP SpO2   02/14/17 1927 97.7 °F (36.5 °C) (!) 106 20 (!) 187/93 100 %          Thank you for allowing us to provide you with medical care today. We realize that you have many choices for your emergency care needs. Please choose us in the future for any continued health care needs. Sylvia Reid MD    [facility], [facility].   Office: [phone]            Recent Results (from the past 24 hour(s))   EKG, 12 LEAD, INITIAL    Collection Time: 02/14/17  7:33 PM   Result Value Ref Range    Ventricular Rate 98 BPM    Atrial Rate 98 BPM    P-R Interval 98 ms    QRS Duration 76 ms    Q-T Interval 338 ms    QTC Calculation (Bezet) 431 ms    Calculated P Axis 35 degrees    Calculated R Axis 38 degrees    Calculated T Axis -6 degrees    Diagnosis       Sinus rhythm with short OR  Nonspecific T wave abnormality  When compared with ECG of 19-JAN-2017 13:40,  Nonspecific T wave abnormality now evident in Lateral leads     CBC WITH AUTOMATED DIFF    Collection Time: 02/14/17  9:43 PM   Result Value Ref Range    WBC 8.6 3.6 - 11.0 K/uL    RBC 4.12 3.80 - 5.20 M/uL    HGB 10.4 (L) 11.5 - 16.0 g/dL    HCT 32.8 (L) 35.0 - 47.0 %    MCV 79.6 (L) 80.0 - 99.0 FL    MCH 25.2 (L) 26.0 - 34.0 PG    MCHC 31.7 30.0 - 36.5 g/dL    RDW 17.3 (H) 11.5 - 14.5 %    PLATELET 220 736 - 322 K/uL    NEUTROPHILS 61 32 - 75 %    LYMPHOCYTES 27 12 - 49 %    MONOCYTES 9 5 - 13 %    EOSINOPHILS 3 0 - 7 %    BASOPHILS 0 0 - 1 %    ABS. NEUTROPHILS 5.2 1.8 - 8.0 K/UL    ABS. LYMPHOCYTES 2.3 0.8 - 3.5 K/UL    ABS. MONOCYTES 0.8 0.0 - 1.0 K/UL    ABS. EOSINOPHILS 0.3 0.0 - 0.4 K/UL    ABS.  BASOPHILS 0.0 0.0 - 0.1 K/UL    DF SMEAR SCANNED      RBC COMMENTS ANISOCYTOSIS  1+       METABOLIC PANEL, COMPREHENSIVE    Collection Time: 02/14/17  9:43 PM   Result Value Ref Range    Sodium 140 136 - 145 mmol/L    Potassium 3.3 (L) 3.5 - 5.1 mmol/L    Chloride 104 97 - 108 mmol/L CO2 26 21 - 32 mmol/L    Anion gap 10 5 - 15 mmol/L    Glucose 117 (H) 65 - 100 mg/dL    BUN 11 6 - 20 MG/DL    Creatinine 0.52 (L) 0.55 - 1.02 MG/DL    BUN/Creatinine ratio 21 (H) 12 - 20      GFR est AA >60 >60 ml/min/1.73m2    GFR est non-AA >60 >60 ml/min/1.73m2    Calcium 8.7 8.5 - 10.1 MG/DL    Bilirubin, total 0.2 0.2 - 1.0 MG/DL    ALT (SGPT) 27 12 - 78 U/L    AST (SGOT) 13 (L) 15 - 37 U/L    Alk. phosphatase 62 45 - 117 U/L    Protein, total 7.0 6.4 - 8.2 g/dL    Albumin 3.7 3.5 - 5.0 g/dL    Globulin 3.3 2.0 - 4.0 g/dL    A-G Ratio 1.1 1.1 - 2.2     LIPASE    Collection Time: 02/14/17  9:43 PM   Result Value Ref Range    Lipase 218 73 - 393 U/L       Xr Chest Pa Lat    Result Date: 2/14/2017  Indication: Chest pain. 3 week history of shortness of breath. Exam: PA and lateral views of the chest. There is no prior study for direct comparison. Findings: Cardiomediastinal silhouette is within normal limits. Lungs are clear bilaterally. Pleural spaces are normal. Osseous structures are intact. IMPRESSION: No acute cardiopulmonary disease. [facility]    Result Date: 2/14/2017  EXAM:  [facility] INDICATION:  RUQ pain, no fever, no elevated WBC COMPARISON: None. TECHNIQUE: Real-time sonography of the abdomen was performed with multiple static images of the liver, gallbladder, pancreas, , and right kidney obtained. FINDINGS: The liver is normal in echotexture. There is no mass or other focal abnormality. There is a hypoechoic area in tear to the liver, between the left lobe of the liver and the anterior abdominal wall which appears to represent interposed fat. Portal venous flow is normal. The gallbladder is not well distended. There are no large stones but small stones cannot be excluded. There is no wall thickening or fluid around the gallbladder. There is no biliary duct dilatation and the common duct measures 3 mm in diameter.  The visualized portion of the pancreas appears normal.  The right kidney demonstrates normal echogenicity. There is  no solid mass, stone or hydronephrosis. The right kidney measures 10.9 cm. The visualized portion of the IVC is unremarkable. IMPRESSION: Gallbladder suboptimally distended. No large stones demonstrated. Otherwise negative right upper quadrant ultrasound.

## 2017-02-15 NOTE — ED PROVIDER NOTES
HPI Comments: 35 y.o. female with no significant past medical history who presents from home with chief complaint of abdominal pain. Patient arrives complaining of epigastric pain that radiates up to throat and to RUQ abdomen for 3 weeks. Patient states pain is constant and has progressively worsened. Pain is described as a \"burning\" sensation. Pain is exacerbated with eating. Patient states she has had appetite decrease due to pain and has lost 20 pounds. Patient also complains of SOB and sore throat secondary to pain. Patient was seen for symptoms and referred to ED to get \"thyroid and esophagus checked. \" Patient states she has taken Acid medication, Ibuprofen, and Benadryl with no relief. Patient denies nausea, vomiting, diarrhea, and hx of symptoms. There are no other acute medical concerns at this time. Social hx: No ETOH. Non smoker. PCP: None    Note written by Kan Delcid. Smiley Mabry, as dictated by Reese Avalos MD 9:23 PM        The history is provided by the patient. History reviewed. No pertinent past medical history. History reviewed. No pertinent past surgical history. History reviewed. No pertinent family history. Social History     Social History    Marital status: SINGLE     Spouse name: N/A    Number of children: N/A    Years of education: N/A     Occupational History    Not on file. Social History Main Topics    Smoking status: Never Smoker    Smokeless tobacco: Not on file    Alcohol use No    Drug use: Not on file    Sexual activity: Not on file     Other Topics Concern    Not on file     Social History Narrative         ALLERGIES: Review of patient's allergies indicates no known allergies. Review of Systems   Constitutional: Positive for appetite change and unexpected weight change. Negative for fever. HENT: Positive for sore throat. Negative for facial swelling. Eyes: Negative for visual disturbance.    Respiratory: Negative for chest tightness. Cardiovascular: Negative for chest pain. Gastrointestinal: Positive for abdominal pain. Negative for diarrhea, nausea and vomiting. Genitourinary: Negative for dysuria. Musculoskeletal: Negative for arthralgias. Skin: Negative for rash. Neurological: Negative for dizziness. Hematological: Negative for adenopathy. Psychiatric/Behavioral: Negative for suicidal ideas. Vitals:    02/14/17 1927   BP: (!) 187/93   Pulse: (!) 106   Resp: 20   Temp: 97.7 °F (36.5 °C)   SpO2: 100%   Weight: 63.7 kg (140 lb 6 oz)   Height: 5' 1\" (1.549 m)            Physical Exam   Constitutional: She is oriented to person, place, and time. She appears well-developed and well-nourished. No distress. HENT:   Head: Normocephalic and atraumatic. Mouth/Throat: Oropharynx is clear and moist.   Eyes: Pupils are equal, round, and reactive to light. No scleral icterus. Neck: Normal range of motion. Neck supple. No thyromegaly present. Cardiovascular: Normal rate, regular rhythm, normal heart sounds and intact distal pulses. No murmur heard. Pulmonary/Chest: Effort normal and breath sounds normal. No respiratory distress. Abdominal: Soft. Bowel sounds are normal. She exhibits no distension. There is no tenderness. Musculoskeletal: Normal range of motion. She exhibits no edema. Neurological: She is alert and oriented to person, place, and time. Skin: Skin is warm and dry. No rash noted. She is not diaphoretic. Nursing note and vitals reviewed. MDM  Number of Diagnoses or Management Options  Diagnosis management comments: A:  74XP F with epigastric and RUQ pain that radiates up to patient's throat. Present for past month. Pt reports 2 previous visits to [facility] ED but that they \"didn't do anything for her. \"  She is requesting thyroid tests and also that we test her esophagus. VS stable with slight tachycardia and blood pressure elevation. Exam reassuring with no focal ttp.   Symptoms could be biliary colic, GERD/PUD, pancreatitis, esophageal stricture/spasming. P:  Labs  cxr  ECG  ruq us    ED Course       Procedures    ED EKG interpretation:  Rhythm: normal sinus rhythm. Rate (approx.): 98. Axis: normal.  ST segment:  No concerning ST elevations or depressions. This EKG was interpreted by Sveta Davis MD,ED Provider. Labs unremarkable  Lipase normal    CXR unremarkable. RUQ US unremarkable. 11:10 PM  Patient resting comfortably with no complaints at this time. VS remain stable. Repeat physical exam is unremarkable. Pt ambulatory without difficulty and tolerating po's well. Symptoms most likely result of acid reflux/PUD. Will start treating with pepcid/carafate. F/u with PCP or [facility]. May need endoscopy.